# Patient Record
Sex: MALE | Race: BLACK OR AFRICAN AMERICAN | NOT HISPANIC OR LATINO | Employment: OTHER | URBAN - METROPOLITAN AREA
[De-identification: names, ages, dates, MRNs, and addresses within clinical notes are randomized per-mention and may not be internally consistent; named-entity substitution may affect disease eponyms.]

---

## 2018-01-30 NOTE — PROGRESS NOTES
This note was created by combination of typed  and Dragon dictation.  Transcription errors may be present.  If there are any questions, please contact me.    Assessment & Plan  Essential hypertension - stable, no change, refilled amlodipine.  -     amLODIPine (NORVASC) 10 MG tablet; Take 1 tablet (10 mg total) by mouth once daily.  Dispense: 90 tablet; Refill: 3    Smoker - precontemplative stage of quitting.  AAA screening negative with most recent CT Abd/pelvis. Discussed risks of continued smoking including CA risk, CAD, stroke risk, PAD risk.    Prostate cancer - managed by [facility] urology.    Pure hypercholesterolemia - with risk factors of age, HTN, smoker, start statin, lipitor 20.  -     atorvastatin (LIPITOR) 20 MG tablet; Take 1 tablet (20 mg total) by mouth once daily.  Dispense: 90 tablet; Refill: 3    Old records request from [facility] requested  [facility] notes reviewed    Medications Discontinued During This Encounter   Medication Reason    amLODIPine (NORVASC) 10 MG tablet Reorder       Follow-up: No Follow-up on file. Fasting f/u 6 months.      =================================================================      No chief complaint on file.      MONTEZ Aleman is a 67 y.o. male, last appointment with this clinic was Visit date not found.    NP; was going to [facility].  Dr. Burkett.  Until change of insurance.    Hx of prostate CA.  S/p prostatectomy and XRT. [facility].  s/p open RRP in 2/16/2012. His preop bx was Gl 3+3, PSA was 5.1. Final pathology was Gl 3+4, margins negative, no SVs identified in specimen, no lymph nodes collected, pT2c, pNx, pMx. He got lost to follow up and on return his PSA is was 1.2. Harley of <0.01. Is now s/p salvage radiation therapy and PSA has been undetectable since.  10/24/17 CT Impression 1. No evidence of urolithiasis or obstructive uropathy.2. Status post prostatectomy. No pelvic or retroperitoneal  lymphadenopathy. 3. Stable skeletal sclerotic metastasis.  10/28/17 Cystoscopy Negative  HTN, hx of ARB with angioedema  Smoker 1 PPD x 30 years; precontemplative stage of quitting.   10/2017 CT abd/pelvis for surveillance of prostate CA, negative for AAA.    Was rx'd oxybutynin but was concerned about SE of blurred vision.  So didn't take it.    10/2/2017 microalbumin 2.9   TG 99 HDL 79 LDL 90  /4.6/98/23/9/1.04 glc 101  AST 20 ALT 14  a1c 5.6  TSH 1.64    Had colonoscopy 7/2017, I need report.    Patient Care Team:  Gabriel Ochoa MD as PCP - General (Internal Medicine)  Spike Khan MD (Internal Medicine)  Brook Hanson MD as Consulting Physician (Radiation Oncology)    Patient Active Problem List    Diagnosis Date Noted    Smoker 01/31/2018    Prostate cancer 01/31/2018 7/2016 [facility] bone scan: Focally increased uptake within the L54 vertebral body, concerning for metastatic disease. Additional areas of sclerosis are seen on the CT within the T12 vertebral body inferiorly and several of the lumbar vertebrae. These areas are also concerning. If this would affect management, additional evaluation with MRI with intravenous contrast may be helpful for confirmation.  7/2016 MRI L spine: Heterogeneous prominent focus of enhancement within the right inferior L4 vertebral body.  Although differential may include degenerative change, degree of enhancement and clinical history raise suspicion for metastatic lesion.  Sclerotic foci at other levels are nonspecific and may represent metastatic disease although less likely, or degenerative change. Short interval followup at 1-2 months with repeat contrast MRI may allow for evaluation of progression for Stability. Spondylotic changes as above most prominent L4-5 and L5-S1.  10/2017 CT abd/pelvis: 1. No evidence of urolithiasis or obstructive uropathy. 2. Status post prostatectomy. No pelvic or retroperitoneal lymphadenopathy. 3. Stable skeletal  sclerotic metastasis.      Hypertension, hx of angioedema from ARB; hx of olmesartan/amlodipine changed to amlodipine        PAST MEDICAL HISTORY:  Past Medical History:   Diagnosis Date    Hypertension        PAST SURGICAL HISTORY:  Past Surgical History:   Procedure Laterality Date    CARPAL TUNNEL RELEASE Bilateral     HERNIA REPAIR      PROSTATE SURGERY       Family History   Problem Relation Age of Onset    Heart disease Mother     Heart disease Sister     No Known Problems Brother     No Known Problems Son     No Known Problems Son        SOCIAL HISTORY:  Social History     Social History    Marital status:      Spouse name: N/A    Number of children: N/A    Years of education: N/A     Occupational History    Not on file.     Social History Main Topics    Smoking status: Current Every Day Smoker     Types: Cigarettes    Smokeless tobacco: Never Used    Alcohol use Yes    Drug use: No    Sexual activity: Yes     Partners: Female     Other Topics Concern    Not on file     Social History Narrative    No narrative on file       ALLERGIES AND MEDICATIONS: updated and reviewed.  Review of patient's allergies indicates:  Allergies not on file  Current Outpatient Prescriptions   Medication Sig Dispense Refill    amLODIPine (NORVASC) 10 MG tablet       atorvastatin (LIPITOR) 20 MG tablet Take 1 tablet (20 mg total) by mouth once daily. 90 tablet 3     No current facility-administered medications for this visit.      Oxybutynin not taking    Review of Systems   Constitutional: Negative for fever, malaise/fatigue and weight loss.   HENT: Negative for congestion.    Eyes: Negative for blurred vision and pain.   Respiratory: Negative for shortness of breath and wheezing.    Cardiovascular: Negative for chest pain, palpitations and leg swelling.   Gastrointestinal: Negative for abdominal pain, blood in stool, constipation, diarrhea and heartburn.   Genitourinary: Negative for dysuria,  "hematuria and urgency.   Musculoskeletal: Negative for joint pain.   Neurological: Negative for tingling, focal weakness, weakness and headaches.       Physical Exam   Vitals:    01/31/18 1434   SpO2: 98%   Weight: 98.7 kg (217 lb 9.5 oz)   Height: 6' 2" (1.88 m)    Body mass index is 27.94 kg/m².  Weight: 98.7 kg (217 lb 9.5 oz)   Height: 6' 2" (188 cm)     Physical Exam   Constitutional: He is oriented to person, place, and time. He appears well-developed and well-nourished. No distress.   Eyes: EOM are normal.   Cardiovascular: Normal rate, regular rhythm and normal heart sounds.    No murmur heard.  Pulmonary/Chest: Effort normal and breath sounds normal.   Musculoskeletal: Normal range of motion. He exhibits no edema.   Neurological: He is alert and oriented to person, place, and time. Coordination normal.   Skin: Skin is warm and dry.   Psychiatric: He has a normal mood and affect. His behavior is normal. Thought content normal.     Outside records from [facility] reviewed and updated problem list  Outside labs brought in by the pt from [facility] reviewed and summarized in the HPI  "

## 2018-01-31 ENCOUNTER — OFFICE VISIT (OUTPATIENT)
Dept: FAMILY MEDICINE | Facility: CLINIC | Age: 68
End: 2018-01-31
Payer: MEDICARE

## 2018-01-31 VITALS — OXYGEN SATURATION: 98 % | HEIGHT: 74 IN | BODY MASS INDEX: 27.93 KG/M2 | WEIGHT: 217.63 LBS

## 2018-01-31 DIAGNOSIS — C61 PROSTATE CANCER: ICD-10-CM

## 2018-01-31 DIAGNOSIS — E78.00 PURE HYPERCHOLESTEROLEMIA: ICD-10-CM

## 2018-01-31 DIAGNOSIS — F17.200 SMOKER: ICD-10-CM

## 2018-01-31 DIAGNOSIS — I10 ESSENTIAL HYPERTENSION: Primary | ICD-10-CM

## 2018-01-31 PROCEDURE — 99203 OFFICE O/P NEW LOW 30 MIN: CPT | Mod: S$GLB,,, | Performed by: INTERNAL MEDICINE

## 2018-01-31 PROCEDURE — 99999 PR PBB SHADOW E&M-NEW PATIENT-LVL III: CPT | Mod: PBBFAC,,, | Performed by: INTERNAL MEDICINE

## 2018-01-31 PROCEDURE — 3008F BODY MASS INDEX DOCD: CPT | Mod: S$GLB,,, | Performed by: INTERNAL MEDICINE

## 2018-01-31 RX ORDER — ATORVASTATIN CALCIUM 20 MG/1
20 TABLET, FILM COATED ORAL DAILY
Qty: 90 TABLET | Refills: 3 | Status: SHIPPED | OUTPATIENT
Start: 2018-01-31 | End: 2019-01-02 | Stop reason: SDUPTHER

## 2018-01-31 RX ORDER — AMLODIPINE BESYLATE 10 MG/1
TABLET ORAL
COMMUNITY
Start: 2017-11-13 | End: 2018-01-31 | Stop reason: SDUPTHER

## 2018-01-31 RX ORDER — AMLODIPINE BESYLATE 10 MG/1
10 TABLET ORAL DAILY
Qty: 90 TABLET | Refills: 3 | Status: SHIPPED | OUTPATIENT
Start: 2018-01-31 | End: 2019-01-02 | Stop reason: SDUPTHER

## 2018-02-02 ENCOUNTER — TELEPHONE (OUTPATIENT)
Dept: FAMILY MEDICINE | Facility: CLINIC | Age: 68
End: 2018-02-02

## 2018-02-02 NOTE — TELEPHONE ENCOUNTER
----- Message from Francisca Henry sent at 2/1/2018  2:13 PM CST -----  Contact: self  Patient requests to speak with staff regarding medications. He can be reached at [phone]. Thank you!

## 2018-07-27 PROBLEM — E78.00 PURE HYPERCHOLESTEROLEMIA: Status: ACTIVE | Noted: 2018-07-27

## 2018-07-27 NOTE — PROGRESS NOTES
This note was created by combination of typed  and Dragon dictation.  Transcription errors may be present.  If there are any questions, please contact me.    Assessment & Plan:   Essential hypertension  -I was under the impression he is on amlodipine monotherapy. He thinks he is still taking lito (amlodipine/olmesartan).  He'll check his meds at home and call back to confirm. Check CMP and lipid.  -     Comprehensive metabolic panel; Future; Expected date: 07/30/2018  -     Lipid panel; Future; Expected date: 07/30/2018    Pure hypercholesterolemia  -risk factors - age, HTN, smoker.  Suspect his risk will be high enough to take statin. He filled the rx but never took it b/c was worried about listed SE.  Discussed typical SE profile and would start if indicated and I suspect his levels will support its use  Start ASA 81 mg.  -     aspirin (ECOTRIN) 81 MG EC tablet; Take 1 tablet (81 mg total) by mouth once daily.; Refill: 0    Smoker  -precontemplative stage of quitting. We talked about nicotine replacement therapy. Would start nicotine patch 7 mg if he's interested.  Available OTC.  -     Lipid panel; Future; Expected date: 07/30/2018    Need for hepatitis C screening test  -     Hepatitis C antibody; Future; Expected date: 07/30/2018    Visual acuity reduced - referred to optometry.  -     Ambulatory referral to Optometry    Request for outside colonoscopy report from [facility].    There are no discontinued medications.  Modified Medications    No medications on file     New Prescriptions    ASPIRIN (ECOTRIN) 81 MG EC TABLET    Take 1 tablet (81 mg total) by mouth once daily.       Follow Up: No Follow-up on file.        Subjective:     Chief Complaint   Patient presents with    Hypertension    Hyperlipidemia       MONTEZ Aleman is a 68 y.o. male, last appointment with this clinic was 1/31/2018.    Hx of prostate CA.  S/p prostatectomy and XRT. [facility].  s/p open RRP in 2/16/2012. His  preop bx was Gl 3+3, PSA was 5.1. Final pathology was Gl 3+4, margins negative, no SVs identified in specimen, no lymph nodes collected, pT2c, pNx, pMx. He got lost to follow up and on return his PSA is was 1.2. Harley of <0.01. Is now s/p salvage radiation therapy and PSA has been undetectable since.  10/24/17 CT Impression 1. No evidence of urolithiasis or obstructive uropathy.2. Status post prostatectomy. No pelvic or retroperitoneal lymphadenopathy. 3. Stable skeletal sclerotic metastasis.  10/28/17 Cystoscopy Negative  HTN, hx of ARB with angioedema  Hyperlipidemia, 1/2018 statin  Smoker 1 PPD x 30 years; precontemplative stage of quitting.   10/2017 CT abd/pelvis for surveillance of prostate CA, negative for AAA.  Had colonoscopy 7/2017, I need report.    Outside labs done with [facility], 09/28/2017 BMP normal.  2017 CT abdomen and pelvis:   1. No evidence of urolithiasis or obstructive uropathy.  2. Status post prostatectomy. No pelvic or retroperitoneal  lymphadenopathy.  3. Stable skeletal sclerotic metastasis.  7/2016 MRI L spine: Heterogeneous prominent focus of enhancement within the right inferior L4 vertebral body.  Although differential may include degenerative change, degree of enhancement and clinical history raise  suspicion for metastatic lesion.  Sclerotic foci at other levels are  nonspecific and may represent metastatic disease although less  likely, or degenerative change. Short interval followup at 1-2 months  with repeat contrast MRI may allow for evaluation of progression for  Stability.    Last seen by urology 9/2017.  PSA with [facility] 1/25/18, 0.3.    Needs labs. Needs HCV screen. Needs colonoscopy or the report.    Was reading the SE profile for statin and was worried and did not start it.   It's unclear if he is taking amlodipine/olmesartan or plain amlodipine.  Outside pharmacy references the [facility].     Not taking ASA.     Would like a routine eye exam for decreased acuity/needs new  glasses.    Thinks he had colonoscopy done at .  Does not know the results.     Recalls about 10 years ago- at work - fell in a 10 foot ditch and now feels occasional twinge of pain in the right lower back area.     Physical activity - bicycling daily.  Prefers this to driving.     Patient Care Team:  Gabriel Ochoa MD as PCP - General (Internal Medicine)  Spike Khan MD (Internal Medicine)  Brook Hanson MD as Consulting Physician (Radiation Oncology)    Patient Active Problem List    Diagnosis Date Noted    Pure hypercholesterolemia 07/27/2018    Smoker 01/31/2018    Prostate cancer 01/31/2018 7/2016 [facility] med bone scan: Focally increased uptake within the L54 vertebral body, concerning for metastatic disease. Additional areas of sclerosis are seen on the CT within the T12 vertebral body inferiorly and several of the lumbar vertebrae. These areas are also concerning. If this would affect management, additional evaluation with MRI with intravenous contrast may be helpful for confirmation.  7/2016 MRI L spine: Heterogeneous prominent focus of enhancement within the right inferior L4 vertebral body.  Although differential may include degenerative change, degree of enhancement and clinical history raise suspicion for metastatic lesion.  Sclerotic foci at other levels are nonspecific and may represent metastatic disease although less likely, or degenerative change. Short interval followup at 1-2 months with repeat contrast MRI may allow for evaluation of progression for Stability. Spondylotic changes as above most prominent L4-5 and L5-S1.  10/2017 CT abd/pelvis: 1. No evidence of urolithiasis or obstructive uropathy. 2. Status post prostatectomy. No pelvic or retroperitoneal lymphadenopathy. 3. Stable skeletal sclerotic metastasis.      Hypertension, hx of angioedema from ARB; hx of olmesartan/amlodipine changed to amlodipine        PAST MEDICAL HISTORY:  Past Medical History:   Diagnosis Date     Hypertension        PAST SURGICAL HISTORY:  Past Surgical History:   Procedure Laterality Date    CARPAL TUNNEL RELEASE Bilateral     HERNIA REPAIR      PROSTATE SURGERY         SOCIAL HISTORY:  Social History     Social History    Marital status:      Spouse name: N/A    Number of children: N/A    Years of education: N/A     Occupational History    Not on file.     Social History Main Topics    Smoking status: Current Every Day Smoker     Types: Cigarettes    Smokeless tobacco: Never Used    Alcohol use Yes    Drug use: No    Sexual activity: Yes     Partners: Female     Other Topics Concern    Not on file     Social History Narrative    No narrative on file       ALLERGIES AND MEDICATIONS: updated and reviewed.  Review of patient's allergies indicates:  No Known Allergies  Current Outpatient Prescriptions   Medication Sig Dispense Refill    amLODIPine (NORVASC) 10 MG tablet Take 1 tablet (10 mg total) by mouth once daily. 90 tablet 3    atorvastatin (LIPITOR) 20 MG tablet Take 1 tablet (20 mg total) by mouth once daily. 90 tablet 3     No current facility-administered medications for this visit.        Review of Systems   Constitutional: Negative for fever, malaise/fatigue and weight loss.   HENT: Negative for congestion.    Eyes: Negative for blurred vision and pain.   Respiratory: Negative for shortness of breath and wheezing.    Cardiovascular: Negative for chest pain, palpitations and leg swelling.   Gastrointestinal: Negative for abdominal pain, blood in stool, constipation, diarrhea and heartburn.   Genitourinary: Negative for dysuria, hematuria and urgency.   Musculoskeletal: Positive for back pain. Negative for joint pain.   Neurological: Negative for tingling, focal weakness, weakness and headaches.   Psychiatric/Behavioral: Negative for depression. The patient is not nervous/anxious.        Objective:   Physical Exam   Vitals:    07/30/18 0901   BP: 120/80   Pulse: 64   Temp: 98.1  "°F (36.7 °C)   Weight: 97.5 kg (214 lb 15.2 oz)   Height: 6' 2.5" (1.892 m)    Body mass index is 27.23 kg/m².  Weight: 97.5 kg (214 lb 15.2 oz)   Height: 6' 2.5" (189.2 cm)     Physical Exam   Constitutional: He is oriented to person, place, and time. He appears well-developed and well-nourished. No distress.   Eyes: EOM are normal.   Neck: No thyromegaly present.   Cardiovascular: Normal rate, regular rhythm and normal heart sounds.    No murmur heard.  Pulmonary/Chest: Effort normal and breath sounds normal.   Musculoskeletal: Normal range of motion. He exhibits no edema.   Pointing to the lateral iliac crest as the area of pain, no TTP; L spine nontender.   Lymphadenopathy:     He has no cervical adenopathy.   Neurological: He is alert and oriented to person, place, and time. Coordination normal.   Skin: Skin is warm and dry.   Psychiatric: He has a normal mood and affect. His behavior is normal. Thought content normal.     "

## 2018-07-30 ENCOUNTER — OFFICE VISIT (OUTPATIENT)
Dept: FAMILY MEDICINE | Facility: CLINIC | Age: 68
End: 2018-07-30
Payer: MEDICARE

## 2018-07-30 ENCOUNTER — LAB VISIT (OUTPATIENT)
Dept: LAB | Facility: HOSPITAL | Age: 68
End: 2018-07-30
Attending: INTERNAL MEDICINE
Payer: MEDICARE

## 2018-07-30 VITALS
TEMPERATURE: 98 F | DIASTOLIC BLOOD PRESSURE: 80 MMHG | HEART RATE: 64 BPM | WEIGHT: 214.94 LBS | SYSTOLIC BLOOD PRESSURE: 120 MMHG | HEIGHT: 75 IN | BODY MASS INDEX: 26.73 KG/M2

## 2018-07-30 DIAGNOSIS — F17.200 SMOKER: ICD-10-CM

## 2018-07-30 DIAGNOSIS — I10 ESSENTIAL HYPERTENSION: Primary | ICD-10-CM

## 2018-07-30 DIAGNOSIS — I10 ESSENTIAL HYPERTENSION: ICD-10-CM

## 2018-07-30 DIAGNOSIS — H54.7 VISUAL ACUITY REDUCED: ICD-10-CM

## 2018-07-30 DIAGNOSIS — E78.00 PURE HYPERCHOLESTEROLEMIA: ICD-10-CM

## 2018-07-30 DIAGNOSIS — Z11.59 NEED FOR HEPATITIS C SCREENING TEST: ICD-10-CM

## 2018-07-30 LAB
ALBUMIN SERPL BCP-MCNC: 3.7 G/DL
ALP SERPL-CCNC: 60 U/L
ALT SERPL W/O P-5'-P-CCNC: 11 U/L
ANION GAP SERPL CALC-SCNC: 8 MMOL/L
AST SERPL-CCNC: 20 U/L
BILIRUB SERPL-MCNC: 0.5 MG/DL
BUN SERPL-MCNC: 9 MG/DL
CALCIUM SERPL-MCNC: 9.3 MG/DL
CHLORIDE SERPL-SCNC: 107 MMOL/L
CHOLEST SERPL-MCNC: 185 MG/DL
CHOLEST/HDLC SERPL: 2.7 {RATIO}
CO2 SERPL-SCNC: 27 MMOL/L
CREAT SERPL-MCNC: 0.9 MG/DL
EST. GFR  (AFRICAN AMERICAN): >60 ML/MIN/1.73 M^2
EST. GFR  (NON AFRICAN AMERICAN): >60 ML/MIN/1.73 M^2
GLUCOSE SERPL-MCNC: 97 MG/DL
HDLC SERPL-MCNC: 69 MG/DL
HDLC SERPL: 37.3 %
LDLC SERPL CALC-MCNC: 98.4 MG/DL
NONHDLC SERPL-MCNC: 116 MG/DL
POTASSIUM SERPL-SCNC: 3.9 MMOL/L
PROT SERPL-MCNC: 7.6 G/DL
SODIUM SERPL-SCNC: 142 MMOL/L
TRIGL SERPL-MCNC: 88 MG/DL

## 2018-07-30 PROCEDURE — 99999 PR PBB SHADOW E&M-EST. PATIENT-LVL IV: CPT | Mod: PBBFAC,,, | Performed by: INTERNAL MEDICINE

## 2018-07-30 PROCEDURE — 36415 COLL VENOUS BLD VENIPUNCTURE: CPT | Mod: PO

## 2018-07-30 PROCEDURE — 3074F SYST BP LT 130 MM HG: CPT | Mod: CPTII,S$GLB,, | Performed by: INTERNAL MEDICINE

## 2018-07-30 PROCEDURE — 86803 HEPATITIS C AB TEST: CPT

## 2018-07-30 PROCEDURE — 80053 COMPREHEN METABOLIC PANEL: CPT

## 2018-07-30 PROCEDURE — 3079F DIAST BP 80-89 MM HG: CPT | Mod: CPTII,S$GLB,, | Performed by: INTERNAL MEDICINE

## 2018-07-30 PROCEDURE — 99214 OFFICE O/P EST MOD 30 MIN: CPT | Mod: S$GLB,,, | Performed by: INTERNAL MEDICINE

## 2018-07-30 PROCEDURE — 80061 LIPID PANEL: CPT

## 2018-07-30 RX ORDER — ASPIRIN 81 MG/1
81 TABLET ORAL DAILY
Refills: 0 | COMMUNITY
Start: 2018-07-30 | End: 2019-07-30

## 2018-07-30 NOTE — PATIENT INSTRUCTIONS
START TAKING LOW DOSE ASPIRIN 81 MG.  DOES NOT MATTER WHICH BRAND.    STOP SMOKING!  IF YOU ARE INTERESTED IN NICOTINE REPLACEMENT - THERE IS LOW DOSE (7 MG) NICOTINE PATCH AND NICOTINE GUM AVAILABLE WITHOUT PRESCRIPTION.

## 2018-07-31 LAB — HCV AB SERPL QL IA: NEGATIVE

## 2018-08-01 DIAGNOSIS — E78.00 PURE HYPERCHOLESTEROLEMIA: Primary | ICD-10-CM

## 2018-08-01 PROBLEM — D12.6 TUBULAR ADENOMA OF COLON: Status: ACTIVE | Noted: 2018-08-01

## 2018-08-01 NOTE — PROGRESS NOTES
Lipid was actually pretty good but with risk factors (age, background, HTN, smoker) 10 year ASCVD is high would start the statin. He picked it up but never started it.    Pt was supposed to confirm what medicine he was taking (amlodipine/olmesartan or just plain amlodipine) - I don't see where he called us back.    Please call pt - what BP med is he taking?  Also, he should start taking his cholesterol medicine.    Please send back to me with reply.  Plan for follow up 6 months.

## 2018-08-06 ENCOUNTER — TELEPHONE (OUTPATIENT)
Dept: FAMILY MEDICINE | Facility: CLINIC | Age: 68
End: 2018-08-06

## 2018-08-06 NOTE — TELEPHONE ENCOUNTER
----- Message from Gabriel Ochoa MD sent at 8/1/2018  7:30 AM CDT -----  Lipid was actually pretty good but with risk factors (age, background, HTN, smoker) 10 year ASCVD is high would start the statin. He picked it up but never started it.    Pt was supposed to confirm what medicine he was taking (amlodipine/olmesartan or just plain amlodipine) - I don't see where he called us back.    Please call pt - what BP med is he taking?  Also, he should start taking his cholesterol medicine.    Please send back to me with reply.  Plan for follow up 6 months.

## 2018-08-07 ENCOUNTER — OFFICE VISIT (OUTPATIENT)
Dept: OPTOMETRY | Facility: CLINIC | Age: 68
End: 2018-08-07
Payer: MEDICARE

## 2018-08-07 DIAGNOSIS — H52.7 REFRACTIVE ERROR: ICD-10-CM

## 2018-08-07 DIAGNOSIS — I10 HYPERTENSION, UNSPECIFIED TYPE: ICD-10-CM

## 2018-08-07 DIAGNOSIS — H25.13 NUCLEAR SCLEROSIS OF BOTH EYES: Primary | ICD-10-CM

## 2018-08-07 PROCEDURE — 99999 PR PBB SHADOW E&M-EST. PATIENT-LVL II: CPT | Mod: PBBFAC,,, | Performed by: OPTOMETRIST

## 2018-08-07 PROCEDURE — 92015 DETERMINE REFRACTIVE STATE: CPT | Mod: S$GLB,,, | Performed by: OPTOMETRIST

## 2018-08-07 PROCEDURE — 92004 COMPRE OPH EXAM NEW PT 1/>: CPT | Mod: S$GLB,,, | Performed by: OPTOMETRIST

## 2018-08-07 NOTE — PROGRESS NOTES
Subjective:       Patient ID: Ash Cdeeno is a 68 y.o. male      Chief Complaint   Patient presents with    Concerns About Ocular Health    Hypertensive Eye Exam     History of Present Illness  Dls: ? Yrs     69 y/o male presents today for hypertensive eye exam.   Pt c.o blurry vision at near ou. Pt wears single vision glasses for   reading.     No tearing  No itching   No burning  No pain  No ha's  No floaters  No flashes     Eye meds:  Visine ou prn          Assessment/Plan:     1. Nuclear sclerosis of both eyes  Educated pt on presence of cataracts and effects on vision. No surgery at this time. Recheck in one year.    2. Hypertension, unspecified type  No hypertensive retinopathy. Continue BP control. RTC 1 year for DFE.    3. Refractive error  Educated patient on refractive error and discussed lens options. Dispensed updated spectacle Rx. Educated about adaptation period to new specs.    Eyeglass Final Rx     Eyeglass Final Rx       Sphere Cylinder Axis Add    Right +0.50 +1.50 180 +2.50    Left +1.00 +0.75 175 +2.50    Expiration Date:  8/8/2019                  Follow-up in about 1 year (around 8/7/2019).

## 2018-08-07 NOTE — LETTER
August 7, 2018      Gabriel Ochoa MD  [address] [place] [address]           [facility]  [address] [place] [address]  Phone: [phone]  Fax: [phone]          Patient: Ash Cedeno   MR Number: [id number]   YOB: 1950   Date of Visit: 8/7/2018       Dear Dr. Gabriel Ochoa:    Thank you for referring Ash Cedeno to me for evaluation. Attached you will find relevant portions of my assessment and plan of care.    If you have questions, please do not hesitate to call me. I look forward to following Ash Cedeno along with you.    Sincerely,    Martha Souza, OD    Enclosure  CC:  No Recipients    If you would like to receive this communication electronically, please contact [email or web address] or [phone] to request more information on [employer] access.    For providers and/or their staff who would like to refer a patient to [facility], please contact us through our one-stop-shop provider referral line, [facility], at [phone].    If you feel you have received this communication in error or would no longer like to receive these types of communications, please e-mail [email or web address]

## 2018-08-08 ENCOUNTER — TELEPHONE (OUTPATIENT)
Dept: ADMINISTRATIVE | Facility: HOSPITAL | Age: 68
End: 2018-08-08

## 2018-08-08 NOTE — TELEPHONE ENCOUNTER
Spoke with pt he is taking the Amlodipine, pt verbally acknowledged instructions as stated below.

## 2018-12-05 ENCOUNTER — TELEPHONE (OUTPATIENT)
Dept: FAMILY MEDICINE | Facility: CLINIC | Age: 68
End: 2018-12-05

## 2018-12-05 NOTE — TELEPHONE ENCOUNTER
----- Message from Flor Palacio sent at 12/5/2018  9:33 AM CST -----  Contact: Self   Pt calling to speak to a nurse regarding recall on med. [phone]

## 2018-12-05 NOTE — TELEPHONE ENCOUNTER
Spoke with pt states he saw on tv bp medication is recalled . Advised to contact his pharmacy to verify if his medication has been effected. Pt verbalized understanding.

## 2018-12-31 NOTE — PROGRESS NOTES
This note was created by combination of typed  and Dragon dictation.  Transcription errors may be present.  If there are any questions, please contact me.    Assessment & Plan:   Pure hypercholesterolemia  -check lipid profile on Lipitor 20.  Refill to pharmacy.  -     Comprehensive metabolic panel; Future; Expected date: 01/02/2019  -     Lipid panel; Future; Expected date: 01/02/2019  -     atorvastatin (LIPITOR) 20 MG tablet; Take 1 tablet (20 mg total) by mouth once daily.  Dispense: 90 tablet; Refill: 3    Prostate cancer s/p prostatectomy 2/16/12 and XRT with [facility]  -overdue for follow-up with Urology at [facility].  Check PSA.  They should be able to see the results.  -     PROSTATE SPECIFIC ANTIGEN, DIAGNOSTIC; Future; Expected date: 01/02/2019    Need for vaccination for Strep pneumoniae  -     (In Office Administered) Pneumococcal Conjugate Vaccine (13 Valent) (IM)    Needs flu shot  -     Influenza - High Dose (65+) (PF) (IM)    Essential hypertension  -stable, no change, amlodipine 10  -     amLODIPine (NORVASC) 10 MG tablet; Take 1 tablet (10 mg total) by mouth once daily.  Dispense: 90 tablet; Refill: 3    Need for shingles vaccine  -     varicella-zoster gE-AS01B, PF, (SHINGRIX, PF,) 50 mcg/0.5 mL injection; Inject 0.5 mLs into the muscle once. Repeat in 2 months for 1 dose  Dispense: 0.5 mL; Refill: 1    Tinea pedis of both feet  -topical nystatin powder.  No evidence of cellulitis.  I have also recommended he get over-the-counter Zeasorb as well.  -     nystatin (MYCOSTATIN) powder; Apply topically 4 (four) times daily. To both feet  Dispense: 60 g; Refill: 2    Smoker  -precontemplative stage of quitting. Discussed benefits of quitting.     Other orders  -     Cancel: Comprehensive metabolic panel; Future; Expected date: 12/31/2018  -     Cancel: Lipid panel; Future; Expected date: 12/31/2018        Medications Discontinued During This Encounter   Medication Reason    amLODIPine (NORVASC)  10 MG tablet Reorder    atorvastatin (LIPITOR) 20 MG tablet Reorder       meds sent this encounter:  Medications Ordered This Encounter   Medications    amLODIPine (NORVASC) 10 MG tablet     Sig: Take 1 tablet (10 mg total) by mouth once daily.     Dispense:  90 tablet     Refill:  3    atorvastatin (LIPITOR) 20 MG tablet     Sig: Take 1 tablet (20 mg total) by mouth once daily.     Dispense:  90 tablet     Refill:  3    nystatin (MYCOSTATIN) powder     Sig: Apply topically 4 (four) times daily. To both feet     Dispense:  60 g     Refill:  2    varicella-zoster gE-AS01B, PF, (SHINGRIX, PF,) 50 mcg/0.5 mL injection     Sig: Inject 0.5 mLs into the muscle once. Repeat in 2 months for 1 dose     Dispense:  0.5 mL     Refill:  1       Follow Up: No Follow-up on file. if all normal OV 6 months recall set    Subjective:     Chief Complaint   Patient presents with    Hypertension    Hyperlipidemia    raw between his toes       MONTEZ Aleman is a 68 y.o. male, last appointment with this clinic was 7/30/2018.    Hx of prostate CA.  S/p prostatectomy and XRT. [facility].  s/p open RRP in 2/16/2012. His preop bx was Gl 3+3, PSA was 5.1. Final pathology was Gl 3+4, margins negative, no SVs identified in specimen, no lymph nodes collected, pT2c, pNx, pMx. He got lost to follow up and on return his PSA is was 1.2. Harley of <0.01. Is now s/p salvage radiation therapy and PSA has been undetectable since.  10/24/17 CT Impression 1. No evidence of urolithiasis or obstructive uropathy.2. Status post prostatectomy. No pelvic or retroperitoneal lymphadenopathy. 3. Stable skeletal sclerotic metastasis.  10/28/17 Cystoscopy Negative  HTN, hx of ARB with angioedema  Hyperlipidemia, 7/2018 statin  Smoker 1 PPD x 30 years; precontemplative stage of quitting.   10/2017 CT abd/pelvis for surveillance of prostate CA, negative for AAA.  Had colonoscopy 8/8/17 with transverse colon polyp repeat 5 years.    Outside labs done  with [facility], 09/28/2017 BMP normal.  2017 CT abdomen and pelvis:   1. No evidence of urolithiasis or obstructive uropathy.  2. Status post prostatectomy. No pelvic or retroperitoneal  lymphadenopathy.  3. Stable skeletal sclerotic metastasis.  7/2016 MRI L spine: Heterogeneous prominent focus of enhancement within the right inferior L4 vertebral body.  Although differential may include degenerative change, degree of enhancement and clinical history raise  suspicion for metastatic lesion.  Sclerotic foci at other levels are  nonspecific and may represent metastatic disease although less  likely, or degenerative change. Short interval followup at 1-2 months  with repeat contrast MRI may allow for evaluation of progression for  Stability.     Last seen by urology 9/2017.  PSA with [facility] 1/25/18, 0.3.    Last seen in July.  At the time he was not taking statin.  He was worried about side effects.    Labs done in July lipid was actually pretty good but with his risk factors I wanted him to start statin.  He should also be on aspirin.    At his last visit he was on amlodipine monotherapy.    Review of his [facility] pharmacy refills he has been taking amlodipine and atorvastatin as directed.  Is not taking oxybutynin.    Notes raw skin between the toes bilaterally.  Painful on the left.  Both feet. Between the pinky toe and ring toe.  Nothing OTC other than rubbing alcohol after bathing.  Chronic x years but only recently started hurting.     Still smoking, < 1 PPD.  Never nicotine replacement. Not ready to quit.     Overdue for follow up with urology.  Notes dribbling.  I will check PSA for him.  Printed the contact information for urology for him    Patient Care Team:  Gabriel Ochoa MD as PCP - General (Internal Medicine)  Spike Khan MD (Internal Medicine)  Brook Hanson MD as Consulting Physician (Radiation Oncology)    Patient Active Problem List    Diagnosis Date Noted    Nuclear sclerosis of both eyes  08/07/2018    Refractive error 08/07/2018    Tubular adenoma of colon 8/8/217 08/01/2018    Pure hypercholesterolemia 07/27/2018    Smoker 01/31/2018    Prostate cancer s/p prostatectomy 2/16/12 and XRT with [facility] 01/31/2018     s/p open RRP in 2/16/2012. His preop bx was Gl 3+3, PSA was 5.1. Final pathology was Gl 3+4, margins negative, no SVs identified in specimen, no lymph nodes collected, pT2c, pNx, pMx. He got lost to follow up and on return his PSA is was 1.2. Harley of <0.01. Is now s/p salvage radiation therapy and PSA has been undetectable since.  10/28/17 Cystoscopy Negative  7/2016 Nu med bone scan: Focally increased uptake within the L54 vertebral body, concerning for metastatic disease. Additional areas of sclerosis are seen on the CT within the T12 vertebral body inferiorly and several of the lumbar vertebrae. These areas are also concerning. If this would affect management, additional evaluation with MRI with intravenous contrast may be helpful for confirmation.  7/2016 MRI L spine: Heterogeneous prominent focus of enhancement within the right inferior L4 vertebral body.  Although differential may include degenerative change, degree of enhancement and clinical history raise suspicion for metastatic lesion.  Sclerotic foci at other levels are nonspecific and may represent metastatic disease although less likely, or degenerative change. Short interval followup at 1-2 months with repeat contrast MRI may allow for evaluation of progression for Stability. Spondylotic changes as above most prominent L4-5 and L5-S1.  10/2017 CT abd/pelvis: 1. No evidence of urolithiasis or obstructive uropathy. 2. Status post prostatectomy. No pelvic or retroperitoneal lymphadenopathy. 3. Stable skeletal sclerotic metastasis.      Hypertension, hx of angioedema from ARB; hx of olmesartan/amlodipine changed to amlodipine        PAST MEDICAL HISTORY:  Past Medical History:   Diagnosis Date    Hyperlipidemia      Hypertension     Nuclear sclerosis of both eyes 8/7/2018       PAST SURGICAL HISTORY:  Past Surgical History:   Procedure Laterality Date    CARPAL TUNNEL RELEASE Bilateral     HERNIA REPAIR      PROSTATE SURGERY         SOCIAL HISTORY:  Social History     Socioeconomic History    Marital status:      Spouse name: Not on file    Number of children: Not on file    Years of education: Not on file    Highest education level: Not on file   Social Needs    Financial resource strain: Not on file    Food insecurity - worry: Not on file    Food insecurity - inability: Not on file    Transportation needs - medical: Not on file    Transportation needs - non-medical: Not on file   Occupational History    Not on file   Tobacco Use    Smoking status: Current Every Day Smoker     Packs/day: 0.20     Years: 30.00     Pack years: 6.00     Types: Cigarettes    Smokeless tobacco: Never Used   Substance and Sexual Activity    Alcohol use: Yes    Drug use: No    Sexual activity: Yes     Partners: Female   Other Topics Concern    Not on file   Social History Narrative    Not on file       ALLERGIES AND MEDICATIONS: updated and reviewed.  Review of patient's allergies indicates:  No Known Allergies  Current Outpatient Medications   Medication Sig Dispense Refill    amLODIPine (NORVASC) 10 MG tablet Take 1 tablet (10 mg total) by mouth once daily. 90 tablet 3    aspirin (ECOTRIN) 81 MG EC tablet Take 1 tablet (81 mg total) by mouth once daily.  0    atorvastatin (LIPITOR) 20 MG tablet Take 1 tablet (20 mg total) by mouth once daily. 90 tablet 3     No current facility-administered medications for this visit.        Review of Systems   Constitutional: Negative for chills and fever.   Respiratory: Negative for shortness of breath.    Cardiovascular: Negative for chest pain and palpitations.       Objective:   Physical Exam   Vitals:    01/02/19 1047   BP: 122/82   Pulse: 88   Temp: 98.4 °F (36.9 °C)   SpO2:  "99%   Weight: 98.9 kg (218 lb)   Height: 6' 2.5" (1.892 m)    Body mass index is 27.62 kg/m².  Weight: 98.9 kg (218 lb)   Height: 6' 2.5" (189.2 cm)     Physical Exam   Constitutional: He is oriented to person, place, and time. He appears well-developed and well-nourished. No distress.   Eyes: EOM are normal.   Cardiovascular: Normal rate, regular rhythm and normal heart sounds.   No murmur heard.  Pulmonary/Chest: Effort normal and breath sounds normal.   Musculoskeletal: Normal range of motion.   Neurological: He is alert and oriented to person, place, and time. Coordination normal.   Skin: Rash noted.   intertrigenous areas between the toes with macerated skin in multiple areas without erythema or drainage.  No edema   Psychiatric: He has a normal mood and affect. His behavior is normal. Thought content normal.     "

## 2019-01-02 ENCOUNTER — OFFICE VISIT (OUTPATIENT)
Dept: FAMILY MEDICINE | Facility: CLINIC | Age: 69
End: 2019-01-02
Payer: MEDICARE

## 2019-01-02 ENCOUNTER — LAB VISIT (OUTPATIENT)
Dept: LAB | Facility: HOSPITAL | Age: 69
End: 2019-01-02
Attending: INTERNAL MEDICINE
Payer: MEDICARE

## 2019-01-02 VITALS
SYSTOLIC BLOOD PRESSURE: 122 MMHG | HEART RATE: 88 BPM | DIASTOLIC BLOOD PRESSURE: 82 MMHG | TEMPERATURE: 98 F | WEIGHT: 218 LBS | HEIGHT: 75 IN | BODY MASS INDEX: 27.1 KG/M2 | OXYGEN SATURATION: 99 %

## 2019-01-02 DIAGNOSIS — Z23 NEED FOR VACCINATION FOR STREP PNEUMONIAE: ICD-10-CM

## 2019-01-02 DIAGNOSIS — C61 PROSTATE CANCER: ICD-10-CM

## 2019-01-02 DIAGNOSIS — Z23 NEEDS FLU SHOT: ICD-10-CM

## 2019-01-02 DIAGNOSIS — Z23 NEED FOR SHINGLES VACCINE: ICD-10-CM

## 2019-01-02 DIAGNOSIS — F17.200 SMOKER: ICD-10-CM

## 2019-01-02 DIAGNOSIS — I10 ESSENTIAL HYPERTENSION: ICD-10-CM

## 2019-01-02 DIAGNOSIS — E78.00 PURE HYPERCHOLESTEROLEMIA: ICD-10-CM

## 2019-01-02 DIAGNOSIS — B35.3 TINEA PEDIS OF BOTH FEET: ICD-10-CM

## 2019-01-02 DIAGNOSIS — E78.00 PURE HYPERCHOLESTEROLEMIA: Primary | ICD-10-CM

## 2019-01-02 LAB
ALBUMIN SERPL BCP-MCNC: 3.7 G/DL
ALP SERPL-CCNC: 62 U/L
ALT SERPL W/O P-5'-P-CCNC: 17 U/L
ANION GAP SERPL CALC-SCNC: 7 MMOL/L
AST SERPL-CCNC: 22 U/L
BILIRUB SERPL-MCNC: 0.4 MG/DL
BUN SERPL-MCNC: 11 MG/DL
CALCIUM SERPL-MCNC: 9.5 MG/DL
CHLORIDE SERPL-SCNC: 104 MMOL/L
CHOLEST SERPL-MCNC: 129 MG/DL
CHOLEST/HDLC SERPL: 2.1 {RATIO}
CO2 SERPL-SCNC: 28 MMOL/L
COMPLEXED PSA SERPL-MCNC: 0.17 NG/ML
CREAT SERPL-MCNC: 1 MG/DL
EST. GFR  (AFRICAN AMERICAN): >60 ML/MIN/1.73 M^2
EST. GFR  (NON AFRICAN AMERICAN): >60 ML/MIN/1.73 M^2
GLUCOSE SERPL-MCNC: 91 MG/DL
HDLC SERPL-MCNC: 62 MG/DL
HDLC SERPL: 48.1 %
LDLC SERPL CALC-MCNC: 50.4 MG/DL
NONHDLC SERPL-MCNC: 67 MG/DL
POTASSIUM SERPL-SCNC: 4.3 MMOL/L
PROT SERPL-MCNC: 7.9 G/DL
SODIUM SERPL-SCNC: 139 MMOL/L
TRIGL SERPL-MCNC: 83 MG/DL

## 2019-01-02 PROCEDURE — G0008 ADMIN INFLUENZA VIRUS VAC: HCPCS | Mod: S$GLB,,, | Performed by: INTERNAL MEDICINE

## 2019-01-02 PROCEDURE — 99214 OFFICE O/P EST MOD 30 MIN: CPT | Mod: 25,S$GLB,, | Performed by: INTERNAL MEDICINE

## 2019-01-02 PROCEDURE — 80061 LIPID PANEL: CPT

## 2019-01-02 PROCEDURE — 3079F PR MOST RECENT DIASTOLIC BLOOD PRESSURE 80-89 MM HG: ICD-10-PCS | Mod: CPTII,S$GLB,, | Performed by: INTERNAL MEDICINE

## 2019-01-02 PROCEDURE — 90662 IIV NO PRSV INCREASED AG IM: CPT | Mod: S$GLB,,, | Performed by: INTERNAL MEDICINE

## 2019-01-02 PROCEDURE — 3079F DIAST BP 80-89 MM HG: CPT | Mod: CPTII,S$GLB,, | Performed by: INTERNAL MEDICINE

## 2019-01-02 PROCEDURE — G0009 ADMIN PNEUMOCOCCAL VACCINE: HCPCS | Mod: S$GLB,,, | Performed by: INTERNAL MEDICINE

## 2019-01-02 PROCEDURE — 90662 FLU VACCINE - HIGH DOSE (65+) PRESERVATIVE FREE IM: ICD-10-PCS | Mod: S$GLB,,, | Performed by: INTERNAL MEDICINE

## 2019-01-02 PROCEDURE — 99499 RISK ADDL DX/OHS AUDIT: ICD-10-PCS | Mod: ,,, | Performed by: INTERNAL MEDICINE

## 2019-01-02 PROCEDURE — 1101F PR PT FALLS ASSESS DOC 0-1 FALLS W/OUT INJ PAST YR: ICD-10-PCS | Mod: CPTII,S$GLB,, | Performed by: INTERNAL MEDICINE

## 2019-01-02 PROCEDURE — 80053 COMPREHEN METABOLIC PANEL: CPT

## 2019-01-02 PROCEDURE — 99999 PR PBB SHADOW E&M-EST. PATIENT-LVL III: ICD-10-PCS | Mod: PBBFAC,,, | Performed by: INTERNAL MEDICINE

## 2019-01-02 PROCEDURE — 84153 ASSAY OF PSA TOTAL: CPT

## 2019-01-02 PROCEDURE — 3074F SYST BP LT 130 MM HG: CPT | Mod: CPTII,S$GLB,, | Performed by: INTERNAL MEDICINE

## 2019-01-02 PROCEDURE — 90670 PNEUMOCOCCAL CONJUGATE VACCINE 13-VALENT LESS THAN 5YO & GREATER THAN: ICD-10-PCS | Mod: S$GLB,,, | Performed by: INTERNAL MEDICINE

## 2019-01-02 PROCEDURE — 99499 UNLISTED E&M SERVICE: CPT | Mod: ,,, | Performed by: INTERNAL MEDICINE

## 2019-01-02 PROCEDURE — G0009 PNEUMOCOCCAL CONJUGATE VACCINE 13-VALENT LESS THAN 5YO & GREATER THAN: ICD-10-PCS | Mod: S$GLB,,, | Performed by: INTERNAL MEDICINE

## 2019-01-02 PROCEDURE — 99499 RISK ADDL DX/OHS AUDIT: ICD-10-PCS | Mod: S$GLB,,, | Performed by: INTERNAL MEDICINE

## 2019-01-02 PROCEDURE — 99214 PR OFFICE/OUTPT VISIT, EST, LEVL IV, 30-39 MIN: ICD-10-PCS | Mod: 25,S$GLB,, | Performed by: INTERNAL MEDICINE

## 2019-01-02 PROCEDURE — G0008 FLU VACCINE - HIGH DOSE (65+) PRESERVATIVE FREE IM: ICD-10-PCS | Mod: S$GLB,,, | Performed by: INTERNAL MEDICINE

## 2019-01-02 PROCEDURE — 90670 PCV13 VACCINE IM: CPT | Mod: S$GLB,,, | Performed by: INTERNAL MEDICINE

## 2019-01-02 PROCEDURE — 36415 COLL VENOUS BLD VENIPUNCTURE: CPT | Mod: PO

## 2019-01-02 PROCEDURE — 1101F PT FALLS ASSESS-DOCD LE1/YR: CPT | Mod: CPTII,S$GLB,, | Performed by: INTERNAL MEDICINE

## 2019-01-02 PROCEDURE — 99999 PR PBB SHADOW E&M-EST. PATIENT-LVL III: CPT | Mod: PBBFAC,,, | Performed by: INTERNAL MEDICINE

## 2019-01-02 PROCEDURE — 3074F PR MOST RECENT SYSTOLIC BLOOD PRESSURE < 130 MM HG: ICD-10-PCS | Mod: CPTII,S$GLB,, | Performed by: INTERNAL MEDICINE

## 2019-01-02 PROCEDURE — 99499 UNLISTED E&M SERVICE: CPT | Mod: S$GLB,,, | Performed by: INTERNAL MEDICINE

## 2019-01-02 RX ORDER — ATORVASTATIN CALCIUM 20 MG/1
20 TABLET, FILM COATED ORAL DAILY
Qty: 90 TABLET | Refills: 3 | Status: SHIPPED | OUTPATIENT
Start: 2019-01-02 | End: 2020-02-06

## 2019-01-02 RX ORDER — NYSTATIN 100000 [USP'U]/G
POWDER TOPICAL 4 TIMES DAILY
Qty: 60 G | Refills: 2 | Status: SHIPPED | OUTPATIENT
Start: 2019-01-02

## 2019-01-02 RX ORDER — AMLODIPINE BESYLATE 10 MG/1
10 TABLET ORAL DAILY
Qty: 90 TABLET | Refills: 3 | Status: SHIPPED | OUTPATIENT
Start: 2019-01-02 | End: 2019-02-18

## 2019-01-02 NOTE — PATIENT INSTRUCTIONS
Call your urologist for a follow up visit:    Julio Stein Jr., MD    [address], [place] [id number] [phone] (Fax)        For the feet - get some over the counter Zeasorb - antifungal drying powder - use it in both of your shoes.

## 2019-01-02 NOTE — PROGRESS NOTES
Patient received Pneumococcal 13 vaccine and flu vaccine tolerated it well. Patient received VIS information.

## 2019-01-03 NOTE — PROGRESS NOTES
PSA detectable.  Overdue for follow up with urology at [facility]  CMP and lipid good on statin  Results to pt. No changes.

## 2019-01-21 NOTE — PROGRESS NOTES
This note was created by combination of typed  and Dragon dictation.  Transcription errors may be present.  If there are any questions, please contact me.    Assessment & Plan:   Corn of toe  -no evidence of cellulitis, I don't think this is osteomyelitis. I think the pain is from the corn and pressure that results in pain throughout the proximal phalanx. S/p debridement. Hold on antibiotics. I would expect slow but steady pain improvement. If not- call - referral to podiatry.    Upcoming follow up visit with urology - he'll drop off the OV note when he's done.    There are no discontinued medications.    meds sent this encounter:       Follow Up: No Follow-up on file.    Subjective:     Chief Complaint   Patient presents with    Rash     left foot       HPI  Ash is a 68 y.o. male, last appointment with this clinic was 1/2/2019.    Hx of prostate CA.  S/p prostatectomy and XRT. [facility].  s/p open RRP in 2/16/2012. His preop bx was Gl 3+3, PSA was 5.1. Final pathology was Gl 3+4, margins negative, no SVs identified in specimen, no lymph nodes collected, pT2c, pNx, pMx. He got lost to follow up and on return his PSA is was 1.2. Harley of <0.01. Is now s/p salvage radiation therapy and PSA has been undetectable since.  10/24/17 CT Impression 1. No evidence of urolithiasis or obstructive uropathy.2. Status post prostatectomy. No pelvic or retroperitoneal lymphadenopathy. 3. Stable skeletal sclerotic metastasis.  10/28/17 Cystoscopy Negative  HTN, hx of ARB with angioedema  Hyperlipidemia, 7/2018 statin  Smoker 1 PPD x 30 years; precontemplative stage of quitting.   10/2017 CT abd/pelvis for surveillance of prostate CA, negative for AAA.  Had colonoscopy 8/8/17 with transverse colon polyp repeat 5 years.     Outside labs done with [facility], 09/28/2017 BMP normal.  2017 CT abdomen and pelvis:   1. No evidence of urolithiasis or obstructive uropathy.  2. Status post prostatectomy. No pelvic or  retroperitoneal  lymphadenopathy.  3. Stable skeletal sclerotic metastasis.  7/2016 MRI L spine: Heterogeneous prominent focus of enhancement within the right inferior L4 vertebral body.  Although differential may include degenerative change, degree of enhancement and clinical history raise  suspicion for metastatic lesion.  Sclerotic foci at other levels are  nonspecific and may represent metastatic disease although less  likely, or degenerative change. Short interval followup at 1-2 months  with repeat contrast MRI may allow for evaluation of progression for  Stability.    Last seen earlier this month.   At the time, tinea pedis, nystatin powder.    PSA detectable.  Overdue for follow up with urology at [facility]  CMP and lipid good on statin  Results to pt. No changes.    Upcoming follow up with [facility] urology.     Left foot - the pinky toe hurts a lot.  Burning sensation.  He's been using the drying powder.  Hurts to walk.     Patient Care Team:  Gabriel Ochoa MD as PCP - General (Internal Medicine)  Spike Khan MD (Inactive) (Internal Medicine)  Brook Hanson MD as Consulting Physician (Radiation Oncology)    Patient Active Problem List    Diagnosis Date Noted    Nuclear sclerosis of both eyes 08/07/2018    Refractive error 08/07/2018    Tubular adenoma of colon 8/8/217 08/01/2018    Pure hypercholesterolemia 07/27/2018    Smoker 01/31/2018    Prostate cancer s/p prostatectomy 2/16/12 and XRT with [facility] 01/31/2018     s/p open RRP in 2/16/2012. His preop bx was Gl 3+3, PSA was 5.1. Final pathology was Gl 3+4, margins negative, no SVs identified in specimen, no lymph nodes collected, pT2c, pNx, pMx. He got lost to follow up and on return his PSA is was 1.2. Harley of <0.01. Is now s/p salvage radiation therapy and PSA has been undetectable since.  10/28/17 Cystoscopy Negative  7/2016 Nu med bone scan: Focally increased uptake within the L54 vertebral body, concerning for metastatic disease. Additional areas  of sclerosis are seen on the CT within the T12 vertebral body inferiorly and several of the lumbar vertebrae. These areas are also concerning. If this would affect management, additional evaluation with MRI with intravenous contrast may be helpful for confirmation.  7/2016 MRI L spine: Heterogeneous prominent focus of enhancement within the right inferior L4 vertebral body.  Although differential may include degenerative change, degree of enhancement and clinical history raise suspicion for metastatic lesion.  Sclerotic foci at other levels are nonspecific and may represent metastatic disease although less likely, or degenerative change. Short interval followup at 1-2 months with repeat contrast MRI may allow for evaluation of progression for Stability. Spondylotic changes as above most prominent L4-5 and L5-S1.  10/2017 CT abd/pelvis: 1. No evidence of urolithiasis or obstructive uropathy. 2. Status post prostatectomy. No pelvic or retroperitoneal lymphadenopathy. 3. Stable skeletal sclerotic metastasis.      Hypertension, hx of angioedema from ARB; hx of olmesartan/amlodipine changed to amlodipine        PAST MEDICAL HISTORY:  Past Medical History:   Diagnosis Date    Hyperlipidemia     Hypertension     Nuclear sclerosis of both eyes 8/7/2018       PAST SURGICAL HISTORY:  Past Surgical History:   Procedure Laterality Date    CARPAL TUNNEL RELEASE Bilateral     HERNIA REPAIR      PROSTATE SURGERY         SOCIAL HISTORY:  Social History     Socioeconomic History    Marital status:      Spouse name: Not on file    Number of children: Not on file    Years of education: Not on file    Highest education level: Not on file   Social Needs    Financial resource strain: Not on file    Food insecurity - worry: Not on file    Food insecurity - inability: Not on file    Transportation needs - medical: Not on file    Transportation needs - non-medical: Not on file   Occupational History    Not on file  "  Tobacco Use    Smoking status: Current Every Day Smoker     Packs/day: 0.20     Years: 30.00     Pack years: 6.00     Types: Cigarettes    Smokeless tobacco: Never Used   Substance and Sexual Activity    Alcohol use: Yes    Drug use: No    Sexual activity: Yes     Partners: Female   Other Topics Concern    Not on file   Social History Narrative    Not on file       ALLERGIES AND MEDICATIONS: updated and reviewed.  Review of patient's allergies indicates:  No Known Allergies  Current Outpatient Medications   Medication Sig Dispense Refill    amLODIPine (NORVASC) 10 MG tablet Take 1 tablet (10 mg total) by mouth once daily. 90 tablet 3    atorvastatin (LIPITOR) 20 MG tablet Take 1 tablet (20 mg total) by mouth once daily. 90 tablet 3    nystatin (MYCOSTATIN) powder Apply topically 4 (four) times daily. To both feet 60 g 2    aspirin (ECOTRIN) 81 MG EC tablet Take 1 tablet (81 mg total) by mouth once daily.  0     No current facility-administered medications for this visit.        Review of Systems   All other systems reviewed and are negative.      Objective:   Physical Exam   Vitals:    01/22/19 1308   BP: 122/84   BP Location: Right arm   Patient Position: Sitting   BP Method: Large (Manual)   Pulse: (!) 57   Temp: 98.2 °F (36.8 °C)   TempSrc: Oral   SpO2: 98%   Weight: 100.9 kg (222 lb 7.1 oz)   Height: 6' 2.5" (1.892 m)    Body mass index is 28.18 kg/m².  Weight: 100.9 kg (222 lb 7.1 oz)   Height: 6' 2.5" (189.2 cm)     Physical Exam   Constitutional: He is oriented to person, place, and time. He appears well-developed and well-nourished. No distress.   HENT:   Head: Normocephalic and atraumatic.   Eyes: No scleral icterus.   Pulmonary/Chest: Effort normal.   Musculoskeletal:   The left foot pinky toe medial side with very thick callus/corn, no erythema, it is very thickened overgrown.  The bony phalanx underneath is fairly unremarkable without crepitus or deformity.  The MTP joint is unremarkable and " the metatarsals unremarkable.  The distal phalanx is unremarkable and nontender.  The proximal phalanx is tender with moderate pressure palpation but no edema  Intertriginous area between the 4th and 5th toe is without erythema or maceration   Neurological: He is alert and oriented to person, place, and time.   Skin: Skin is warm and dry.   Psychiatric: He has a normal mood and affect. His behavior is normal. Thought content normal.     The medial side of the pinky toe was cleaned with rubbing alcohol.  Using a #11 Scalpel, sharp debridement of the dead skin with some areas debrided to viable tissue underneath. Pt tolerated without complication.  Did have some slight bleeding from the edge of the debridement field.

## 2019-01-22 ENCOUNTER — OFFICE VISIT (OUTPATIENT)
Dept: FAMILY MEDICINE | Facility: CLINIC | Age: 69
End: 2019-01-22
Payer: MEDICARE

## 2019-01-22 VITALS
DIASTOLIC BLOOD PRESSURE: 84 MMHG | HEART RATE: 57 BPM | BODY MASS INDEX: 27.66 KG/M2 | WEIGHT: 222.44 LBS | OXYGEN SATURATION: 98 % | TEMPERATURE: 98 F | HEIGHT: 75 IN | SYSTOLIC BLOOD PRESSURE: 122 MMHG

## 2019-01-22 DIAGNOSIS — L84 CORN OF TOE: Primary | ICD-10-CM

## 2019-01-22 PROCEDURE — 3074F SYST BP LT 130 MM HG: CPT | Mod: CPTII,S$GLB,, | Performed by: INTERNAL MEDICINE

## 2019-01-22 PROCEDURE — 1101F PR PT FALLS ASSESS DOC 0-1 FALLS W/OUT INJ PAST YR: ICD-10-PCS | Mod: CPTII,S$GLB,, | Performed by: INTERNAL MEDICINE

## 2019-01-22 PROCEDURE — 99999 PR PBB SHADOW E&M-EST. PATIENT-LVL III: ICD-10-PCS | Mod: PBBFAC,,, | Performed by: INTERNAL MEDICINE

## 2019-01-22 PROCEDURE — 1101F PT FALLS ASSESS-DOCD LE1/YR: CPT | Mod: CPTII,S$GLB,, | Performed by: INTERNAL MEDICINE

## 2019-01-22 PROCEDURE — 99213 PR OFFICE/OUTPT VISIT, EST, LEVL III, 20-29 MIN: ICD-10-PCS | Mod: S$GLB,,, | Performed by: INTERNAL MEDICINE

## 2019-01-22 PROCEDURE — 99213 OFFICE O/P EST LOW 20 MIN: CPT | Mod: S$GLB,,, | Performed by: INTERNAL MEDICINE

## 2019-01-22 PROCEDURE — 3079F DIAST BP 80-89 MM HG: CPT | Mod: CPTII,S$GLB,, | Performed by: INTERNAL MEDICINE

## 2019-01-22 PROCEDURE — 3079F PR MOST RECENT DIASTOLIC BLOOD PRESSURE 80-89 MM HG: ICD-10-PCS | Mod: CPTII,S$GLB,, | Performed by: INTERNAL MEDICINE

## 2019-01-22 PROCEDURE — 3074F PR MOST RECENT SYSTOLIC BLOOD PRESSURE < 130 MM HG: ICD-10-PCS | Mod: CPTII,S$GLB,, | Performed by: INTERNAL MEDICINE

## 2019-01-22 PROCEDURE — 99999 PR PBB SHADOW E&M-EST. PATIENT-LVL III: CPT | Mod: PBBFAC,,, | Performed by: INTERNAL MEDICINE

## 2019-01-29 ENCOUNTER — OFFICE VISIT (OUTPATIENT)
Dept: OPTOMETRY | Facility: CLINIC | Age: 69
End: 2019-01-29
Payer: MEDICARE

## 2019-01-29 ENCOUNTER — OFFICE VISIT (OUTPATIENT)
Dept: FAMILY MEDICINE | Facility: CLINIC | Age: 69
End: 2019-01-29
Payer: MEDICARE

## 2019-01-29 VITALS
DIASTOLIC BLOOD PRESSURE: 82 MMHG | OXYGEN SATURATION: 97 % | BODY MASS INDEX: 28.83 KG/M2 | WEIGHT: 224.63 LBS | HEART RATE: 65 BPM | SYSTOLIC BLOOD PRESSURE: 114 MMHG | HEIGHT: 74 IN | TEMPERATURE: 98 F

## 2019-01-29 DIAGNOSIS — H04.002 LACRIMAL GLAND INFLAMMATION, LEFT: Primary | ICD-10-CM

## 2019-01-29 DIAGNOSIS — L03.213 PRESEPTAL CELLULITIS OF LEFT EYE: Primary | ICD-10-CM

## 2019-01-29 PROCEDURE — 92012 PR EYE EXAM, EST PATIENT,INTERMED: ICD-10-PCS | Mod: S$GLB,,, | Performed by: OPTOMETRIST

## 2019-01-29 PROCEDURE — 3074F SYST BP LT 130 MM HG: CPT | Mod: CPTII,S$GLB,, | Performed by: INTERNAL MEDICINE

## 2019-01-29 PROCEDURE — 99999 PR PBB SHADOW E&M-EST. PATIENT-LVL III: ICD-10-PCS | Mod: PBBFAC,,, | Performed by: INTERNAL MEDICINE

## 2019-01-29 PROCEDURE — 99999 PR PBB SHADOW E&M-EST. PATIENT-LVL III: CPT | Mod: PBBFAC,,, | Performed by: INTERNAL MEDICINE

## 2019-01-29 PROCEDURE — 1100F PR PT FALLS ASSESS DOC 2+ FALLS/FALL W/INJURY/YR: ICD-10-PCS | Mod: CPTII,S$GLB,, | Performed by: INTERNAL MEDICINE

## 2019-01-29 PROCEDURE — 99213 OFFICE O/P EST LOW 20 MIN: CPT | Mod: S$GLB,,, | Performed by: INTERNAL MEDICINE

## 2019-01-29 PROCEDURE — 3079F DIAST BP 80-89 MM HG: CPT | Mod: CPTII,S$GLB,, | Performed by: INTERNAL MEDICINE

## 2019-01-29 PROCEDURE — 99999 PR PBB SHADOW E&M-EST. PATIENT-LVL II: CPT | Mod: PBBFAC,,, | Performed by: OPTOMETRIST

## 2019-01-29 PROCEDURE — 99999 PR PBB SHADOW E&M-EST. PATIENT-LVL II: ICD-10-PCS | Mod: PBBFAC,,, | Performed by: OPTOMETRIST

## 2019-01-29 PROCEDURE — 92012 INTRM OPH EXAM EST PATIENT: CPT | Mod: S$GLB,,, | Performed by: OPTOMETRIST

## 2019-01-29 PROCEDURE — 99213 PR OFFICE/OUTPT VISIT, EST, LEVL III, 20-29 MIN: ICD-10-PCS | Mod: S$GLB,,, | Performed by: INTERNAL MEDICINE

## 2019-01-29 PROCEDURE — 3288F PR FALLS RISK ASSESSMENT DOCUMENTED: ICD-10-PCS | Mod: CPTII,S$GLB,, | Performed by: INTERNAL MEDICINE

## 2019-01-29 PROCEDURE — 3288F FALL RISK ASSESSMENT DOCD: CPT | Mod: CPTII,S$GLB,, | Performed by: INTERNAL MEDICINE

## 2019-01-29 PROCEDURE — 3079F PR MOST RECENT DIASTOLIC BLOOD PRESSURE 80-89 MM HG: ICD-10-PCS | Mod: CPTII,S$GLB,, | Performed by: INTERNAL MEDICINE

## 2019-01-29 PROCEDURE — 3074F PR MOST RECENT SYSTOLIC BLOOD PRESSURE < 130 MM HG: ICD-10-PCS | Mod: CPTII,S$GLB,, | Performed by: INTERNAL MEDICINE

## 2019-01-29 PROCEDURE — 1100F PTFALLS ASSESS-DOCD GE2>/YR: CPT | Mod: CPTII,S$GLB,, | Performed by: INTERNAL MEDICINE

## 2019-01-29 RX ORDER — AMOXICILLIN AND CLAVULANATE POTASSIUM 500; 125 MG/1; MG/1
1 TABLET, FILM COATED ORAL 2 TIMES DAILY
Qty: 20 TABLET | Refills: 0 | Status: SHIPPED | OUTPATIENT
Start: 2019-01-29 | End: 2019-02-08

## 2019-01-29 NOTE — PROGRESS NOTES
Subjective:       Patient ID: Ash Cedeno is a 68 y.o. male      Chief Complaint   Patient presents with    Eye Problem     History of Present Illness  Dls: 8/7/18 Dr. Souza    67 y/o male presents today with c/o x 4 days ago notice bump on ARLET increase in swelling ARLET pain when blinking or touching os tearing os no itching no changes in vision.   Pt did not use any gtts.            Assessment/Plan:     1. Preseptal cellulitis of left eye  Preseptal vs dacryadenitis. Pt report edema and tenderness to lateral upper eyelid. No palpable bump on exam. Eye movements FROM with no pain. Start augmentin PO BID x 10 days. Follow up in one week, sooner PRN.   - amoxicillin-clavulanate 500-125mg (AUGMENTIN) 500-125 mg Tab; Take 1 tablet (500 mg total) by mouth 2 (two) times daily. for 10 days  Dispense: 20 tablet; Refill: 0    Follow-up in about 1 week (around 2/5/2019), or if symptoms worsen or fail to improve.

## 2019-01-29 NOTE — PROGRESS NOTES
This note was created by combination of typed  and Dragon dictation.  Transcription errors may be present.  If there are any questions, please contact me.    Assessment & Plan:   Lacrimal gland inflammation, left  -his left eye hurts and he is getting dependent edema on the lower lid.  It localizes to around lacrimal gland on the superior lateral eye orbit.  I am going to refer him to Optometry for evaluation and recommendations.  -     Ambulatory referral to Optometry    There are no discontinued medications.    meds sent this encounter:       Follow Up: No Follow-up on file.    Subjective:     Chief Complaint   Patient presents with    Facial Swelling      left eye       HPI  Ash is a 68 y.o. male, last appointment with this clinic was 1/22/2019.    Hx of prostate CA.  S/p prostatectomy and XRT. [facility].  s/p open RRP in 2/16/2012. His preop bx was Gl 3+3, PSA was 5.1. Final pathology was Gl 3+4, margins negative, no SVs identified in specimen, no lymph nodes collected, pT2c, pNx, pMx. He got lost to follow up and on return his PSA is was 1.2. Harley of <0.01. Is now s/p salvage radiation therapy and PSA has been undetectable since.  10/24/17 CT Impression 1. No evidence of urolithiasis or obstructive uropathy.2. Status post prostatectomy. No pelvic or retroperitoneal lymphadenopathy. 3. Stable skeletal sclerotic metastasis.  10/28/17 Cystoscopy Negative  HTN, hx of ARB with angioedema  Hyperlipidemia, 7/2018 statin  Smoker 1 PPD x 30 years; precontemplative stage of quitting.   10/2017 CT abd/pelvis for surveillance of prostate CA, negative for AAA.  Had colonoscopy 8/8/17 with transverse colon polyp repeat 5 years.    Last seen for corn; it's better.    Left eye painful bump started on Fri and now having worse swelling and accumulation to the lower lid.  Someone told him it was allergy to seafood which he never had before.  He usually eats a seafood plantar every Friday.  So that  is why he was wondering if this was related to seafood.  Vision is normal/unchanged.  No trauma. No pain with moving eyes but with shutting eyes and opening wide, hurts.  No fever no chills.     Patient Care Team:  Gabriel Ochoa MD as PCP - General (Internal Medicine)  Spike Khan MD (Inactive) (Internal Medicine)  Brook Hanson MD as Consulting Physician (Radiation Oncology)    Patient Active Problem List    Diagnosis Date Noted    Nuclear sclerosis of both eyes 08/07/2018    Refractive error 08/07/2018    Tubular adenoma of colon 8/8/217 08/01/2018    Pure hypercholesterolemia 07/27/2018    Smoker 01/31/2018    Prostate cancer s/p prostatectomy 2/16/12 and XRT with [facility] 01/31/2018     s/p open RRP in 2/16/2012. His preop bx was Gl 3+3, PSA was 5.1. Final pathology was Gl 3+4, margins negative, no SVs identified in specimen, no lymph nodes collected, pT2c, pNx, pMx. He got lost to follow up and on return his PSA is was 1.2. Harley of <0.01. Is now s/p salvage radiation therapy and PSA has been undetectable since.  10/28/17 Cystoscopy Negative  7/2016 Nu med bone scan: Focally increased uptake within the L54 vertebral body, concerning for metastatic disease. Additional areas of sclerosis are seen on the CT within the T12 vertebral body inferiorly and several of the lumbar vertebrae. These areas are also concerning. If this would affect management, additional evaluation with MRI with intravenous contrast may be helpful for confirmation.  7/2016 MRI L spine: Heterogeneous prominent focus of enhancement within the right inferior L4 vertebral body.  Although differential may include degenerative change, degree of enhancement and clinical history raise suspicion for metastatic lesion.  Sclerotic foci at other levels are nonspecific and may represent metastatic disease although less likely, or degenerative change. Short interval followup at 1-2 months with repeat contrast MRI may allow for evaluation of  progression for Stability. Spondylotic changes as above most prominent L4-5 and L5-S1.  10/2017 CT abd/pelvis: 1. No evidence of urolithiasis or obstructive uropathy. 2. Status post prostatectomy. No pelvic or retroperitoneal lymphadenopathy. 3. Stable skeletal sclerotic metastasis.      Hypertension, hx of angioedema from ARB; hx of olmesartan/amlodipine changed to amlodipine        PAST MEDICAL HISTORY:  Past Medical History:   Diagnosis Date    Hyperlipidemia     Hypertension     Nuclear sclerosis of both eyes 8/7/2018       PAST SURGICAL HISTORY:  Past Surgical History:   Procedure Laterality Date    CARPAL TUNNEL RELEASE Bilateral     HERNIA REPAIR      PROSTATE SURGERY         SOCIAL HISTORY:  Social History     Socioeconomic History    Marital status:      Spouse name: Not on file    Number of children: Not on file    Years of education: Not on file    Highest education level: Not on file   Social Needs    Financial resource strain: Not on file    Food insecurity - worry: Not on file    Food insecurity - inability: Not on file    Transportation needs - medical: Not on file    Transportation needs - non-medical: Not on file   Occupational History    Not on file   Tobacco Use    Smoking status: Current Every Day Smoker     Packs/day: 0.20     Years: 30.00     Pack years: 6.00     Types: Cigarettes    Smokeless tobacco: Never Used   Substance and Sexual Activity    Alcohol use: Yes    Drug use: No    Sexual activity: Yes     Partners: Female   Other Topics Concern    Not on file   Social History Narrative    Not on file       ALLERGIES AND MEDICATIONS: updated and reviewed.  Review of patient's allergies indicates:  No Known Allergies  Current Outpatient Medications   Medication Sig Dispense Refill    amLODIPine (NORVASC) 10 MG tablet Take 1 tablet (10 mg total) by mouth once daily. 90 tablet 3    aspirin (ECOTRIN) 81 MG EC tablet Take 1 tablet (81 mg total) by mouth once daily.  " 0    atorvastatin (LIPITOR) 20 MG tablet Take 1 tablet (20 mg total) by mouth once daily. 90 tablet 3    nystatin (MYCOSTATIN) powder Apply topically 4 (four) times daily. To both feet 60 g 2     No current facility-administered medications for this visit.        Review of Systems   Constitutional: Negative for chills and fever.   Eyes: Positive for pain. Negative for blurred vision, double vision, photophobia, discharge and redness.   Skin: Negative for rash.       Objective:   Physical Exam   Vitals:    01/29/19 1401   BP: 114/82   Pulse: 65   Temp: 98.4 °F (36.9 °C)   TempSrc: Oral   SpO2: 97%   Weight: 101.9 kg (224 lb 10.4 oz)   Height: 6' 2" (1.88 m)    Body mass index is 28.84 kg/m².  Weight: 101.9 kg (224 lb 10.4 oz)   Height: 6' 2" (188 cm)     Physical Exam   Constitutional: He is oriented to person, place, and time. He appears well-developed and well-nourished. No distress.   HENT:   Head: Normocephalic and atraumatic.   Eyes: No scleral icterus.   This sclera is unremarkable, the irises are clear.  He does have some slight swelling over the superior lateral orbit and around the orbital bone he has some tenderness.  It does seem to localize to the area of his lacrimal gland.  He has some dependent edema on the lower orbit/eyelid.  No mass, no induration.  No erythema, no skin break.  Pupils equal round reactive to light, extraocular muscles intact no photophobia   Pulmonary/Chest: Effort normal.   Neurological: He is alert and oriented to person, place, and time.   Skin: Skin is warm and dry.   Psychiatric: He has a normal mood and affect. His behavior is normal. Thought content normal.     "

## 2019-02-05 ENCOUNTER — INITIAL CONSULT (OUTPATIENT)
Dept: OPTOMETRY | Facility: CLINIC | Age: 69
End: 2019-02-05
Payer: MEDICARE

## 2019-02-05 DIAGNOSIS — L03.213 PRESEPTAL CELLULITIS OF LEFT EYE: Primary | ICD-10-CM

## 2019-02-05 PROCEDURE — 99999 PR PBB SHADOW E&M-EST. PATIENT-LVL II: ICD-10-PCS | Mod: PBBFAC,,, | Performed by: OPTOMETRIST

## 2019-02-05 PROCEDURE — 92012 PR EYE EXAM, EST PATIENT,INTERMED: ICD-10-PCS | Mod: S$GLB,,, | Performed by: OPTOMETRIST

## 2019-02-05 PROCEDURE — 99999 PR PBB SHADOW E&M-EST. PATIENT-LVL II: CPT | Mod: PBBFAC,,, | Performed by: OPTOMETRIST

## 2019-02-05 PROCEDURE — 92012 INTRM OPH EXAM EST PATIENT: CPT | Mod: S$GLB,,, | Performed by: OPTOMETRIST

## 2019-02-05 NOTE — PROGRESS NOTES
Subjective:       Patient ID: Ahs Cedeno is a 68 y.o. male      Chief Complaint   Patient presents with    Follow-up     History of Present Illness  Dls: 1/29/19 Dr. Souza    69 y/o presents today for f/u preseptal cellulitis os.   Pt states os doing much better. No pain     Eye meds  augmentin PO BID         Assessment/Plan:     1. Preseptal cellulitis of left eye  Symptoms resolved. Pt doing much better. No pain. No tenderness. Finish course of oral abx. RTC PRN.    Follow-up if symptoms worsen or fail to improve.

## 2019-02-18 DIAGNOSIS — I10 ESSENTIAL HYPERTENSION: ICD-10-CM

## 2019-02-18 RX ORDER — AMLODIPINE BESYLATE 10 MG/1
TABLET ORAL
Qty: 90 TABLET | Refills: 0 | Status: SHIPPED | OUTPATIENT
Start: 2019-02-18 | End: 2020-02-06

## 2019-08-28 ENCOUNTER — OFFICE VISIT (OUTPATIENT)
Dept: FAMILY MEDICINE | Facility: CLINIC | Age: 69
End: 2019-08-28
Payer: MEDICARE

## 2019-08-28 VITALS
BODY MASS INDEX: 28.36 KG/M2 | WEIGHT: 221 LBS | OXYGEN SATURATION: 99 % | HEART RATE: 60 BPM | HEIGHT: 74 IN | TEMPERATURE: 98 F | DIASTOLIC BLOOD PRESSURE: 80 MMHG | SYSTOLIC BLOOD PRESSURE: 115 MMHG

## 2019-08-28 DIAGNOSIS — L84 CORN OF TOE: Primary | ICD-10-CM

## 2019-08-28 PROCEDURE — 99213 OFFICE O/P EST LOW 20 MIN: CPT | Mod: S$GLB,,, | Performed by: INTERNAL MEDICINE

## 2019-08-28 PROCEDURE — 3074F PR MOST RECENT SYSTOLIC BLOOD PRESSURE < 130 MM HG: ICD-10-PCS | Mod: CPTII,S$GLB,, | Performed by: INTERNAL MEDICINE

## 2019-08-28 PROCEDURE — 1101F PR PT FALLS ASSESS DOC 0-1 FALLS W/OUT INJ PAST YR: ICD-10-PCS | Mod: CPTII,S$GLB,, | Performed by: INTERNAL MEDICINE

## 2019-08-28 PROCEDURE — 99213 PR OFFICE/OUTPT VISIT, EST, LEVL III, 20-29 MIN: ICD-10-PCS | Mod: S$GLB,,, | Performed by: INTERNAL MEDICINE

## 2019-08-28 PROCEDURE — 99999 PR PBB SHADOW E&M-EST. PATIENT-LVL III: CPT | Mod: PBBFAC,,, | Performed by: INTERNAL MEDICINE

## 2019-08-28 PROCEDURE — 3079F DIAST BP 80-89 MM HG: CPT | Mod: CPTII,S$GLB,, | Performed by: INTERNAL MEDICINE

## 2019-08-28 PROCEDURE — 1101F PT FALLS ASSESS-DOCD LE1/YR: CPT | Mod: CPTII,S$GLB,, | Performed by: INTERNAL MEDICINE

## 2019-08-28 PROCEDURE — 99999 PR PBB SHADOW E&M-EST. PATIENT-LVL III: ICD-10-PCS | Mod: PBBFAC,,, | Performed by: INTERNAL MEDICINE

## 2019-08-28 PROCEDURE — 3074F SYST BP LT 130 MM HG: CPT | Mod: CPTII,S$GLB,, | Performed by: INTERNAL MEDICINE

## 2019-08-28 PROCEDURE — 3079F PR MOST RECENT DIASTOLIC BLOOD PRESSURE 80-89 MM HG: ICD-10-PCS | Mod: CPTII,S$GLB,, | Performed by: INTERNAL MEDICINE

## 2019-08-28 RX ORDER — ASPIRIN 81 MG/1
81 TABLET ORAL
COMMUNITY
Start: 2018-07-30

## 2019-08-28 RX ORDER — AMOXICILLIN AND CLAVULANATE POTASSIUM 500; 125 MG/1; MG/1
1 TABLET, FILM COATED ORAL
COMMUNITY
Start: 2019-01-29 | End: 2019-08-28

## 2019-08-28 RX ORDER — ATORVASTATIN CALCIUM 20 MG/1
20 TABLET, FILM COATED ORAL
COMMUNITY
Start: 2019-01-21 | End: 2019-08-28 | Stop reason: SDUPTHER

## 2019-08-28 RX ORDER — MEDICAL SUPPLY, MISCELLANEOUS
MISCELLANEOUS MISCELLANEOUS
COMMUNITY
Start: 2016-02-25

## 2019-08-28 NOTE — PROGRESS NOTES
This note was created by combination of typed  and Dragon dictation.  Transcription errors may be present.  If there are any questions, please contact me.    Assessment & Plan:   Corn of toe  -his discomfort is does seem to be disproportionate to the size of the corn; the skin is also pretty soft.  I am worried about possible underlying ulcer.  He did note some modest improvement after minimal debridement.  I am going to refer him to Podiatry for evaluation and more definitive debridement.  Also would like their recommendations about treatments for macerated tissue as his other toes have not responded to the topical antifungal powder.  -     Ambulatory referral to Podiatry        Medications Discontinued During This Encounter   Medication Reason    amoxicillin-clavulanate 500-125mg (AUGMENTIN) 500-125 mg Tab Patient no longer taking       meds sent this encounter:       Follow Up: No follow-ups on file.    Subjective:     Chief Complaint   Patient presents with    left foot pain     can barely walk       HPI  Ash is a 69 y.o. male, last appointment with this clinic was Visit date not found.    Last visit - corn of the right toes - better after debridement. Now the left foot hurts.  Similar area - between the 4th and 5th toe.  He has been using antifungal drying powder but still has macerated tissue of the right foot in between the toes.  Without pain. Pain on the left is comparable to the quality of pain on the right.  No systemic fevers or chills.  No erythema or swelling.  No drainage.    Patient Care Team:  Gabriel Ochoa MD as PCP - General (Internal Medicine)  Spike Khan MD (Internal Medicine)  Brook Hanson MD as Consulting Physician (Radiation Oncology)    Patient Active Problem List    Diagnosis Date Noted    Nuclear sclerosis of both eyes 08/07/2018    Refractive error 08/07/2018    Tubular adenoma of colon 8/8/217 08/01/2018    Pure hypercholesterolemia 07/27/2018    Smoker  01/31/2018    Prostate cancer s/p prostatectomy 2/16/12 and XRT with [facility]; 1/2019 saw urology - no change unless PSA jumps 01/31/2018     s/p open RRP in 2/16/2012. His preop bx was Gl 3+3, PSA was 5.1. Final pathology was Gl 3+4, margins negative, no SVs identified in specimen, no lymph nodes collected, pT2c, pNx, pMx. He got lost to follow up and on return his PSA is was 1.2. Harley of <0.01. Is now s/p salvage radiation therapy and PSA has been undetectable since.  10/28/17 Cystoscopy Negative  7/2016 Nu med bone scan: Focally increased uptake within the L54 vertebral body, concerning for metastatic disease. Additional areas of sclerosis are seen on the CT within the T12 vertebral body inferiorly and several of the lumbar vertebrae. These areas are also concerning. If this would affect management, additional evaluation with MRI with intravenous contrast may be helpful for confirmation.  7/2016 MRI L spine: Heterogeneous prominent focus of enhancement within the right inferior L4 vertebral body.  Although differential may include degenerative change, degree of enhancement and clinical history raise suspicion for metastatic lesion.  Sclerotic foci at other levels are nonspecific and may represent metastatic disease although less likely, or degenerative change. Short interval followup at 1-2 months with repeat contrast MRI may allow for evaluation of progression for Stability. Spondylotic changes as above most prominent L4-5 and L5-S1.  10/2017 CT abd/pelvis: 1. No evidence of urolithiasis or obstructive uropathy. 2. Status post prostatectomy. No pelvic or retroperitoneal lymphadenopathy. 3. Stable skeletal sclerotic metastasis.      Hypertension, hx of angioedema from ARB; hx of olmesartan/amlodipine changed to amlodipine        PAST MEDICAL HISTORY:  Past Medical History:   Diagnosis Date    Hyperlipidemia     Hypertension     Nuclear sclerosis of both eyes 8/7/2018       PAST SURGICAL HISTORY:  Past Surgical  History:   Procedure Laterality Date    CARPAL TUNNEL RELEASE Bilateral     HERNIA REPAIR      PROSTATE SURGERY         SOCIAL HISTORY:  Social History     Socioeconomic History    Marital status:      Spouse name: Not on file    Number of children: Not on file    Years of education: Not on file    Highest education level: Not on file   Occupational History    Not on file   Social Needs    Financial resource strain: Not on file    Food insecurity:     Worry: Not on file     Inability: Not on file    Transportation needs:     Medical: Not on file     Non-medical: Not on file   Tobacco Use    Smoking status: Current Every Day Smoker     Packs/day: 0.20     Years: 30.00     Pack years: 6.00     Types: Cigarettes    Smokeless tobacco: Never Used   Substance and Sexual Activity    Alcohol use: Yes    Drug use: No    Sexual activity: Yes     Partners: Female   Lifestyle    Physical activity:     Days per week: Not on file     Minutes per session: Not on file    Stress: Not at all   Relationships    Social connections:     Talks on phone: Not on file     Gets together: Not on file     Attends Gnosticist service: Not on file     Active member of club or organization: Not on file     Attends meetings of clubs or organizations: Not on file     Relationship status: Not on file   Other Topics Concern    Not on file   Social History Narrative    Not on file       ALLERGIES AND MEDICATIONS: updated and reviewed.  Review of patient's allergies indicates:  No Known Allergies  Current Outpatient Medications   Medication Sig Dispense Refill    amLODIPine (NORVASC) 10 MG tablet TAKE 1 TABLET(10 MG) BY MOUTH EVERY DAY 90 tablet 0    aspirin (ECOTRIN) 81 MG EC tablet Take 81 mg by mouth.      atorvastatin (LIPITOR) 20 MG tablet Take 1 tablet (20 mg total) by mouth once daily. 90 tablet 3    atorvastatin (LIPITOR) 20 MG tablet Take 20 mg by mouth.      miscellaneous medical supply (C-TUB) Misc Bard  "Cunningham Clamp - Apply to penis for urinary incontinence      nystatin (MYCOSTATIN) powder Apply topically 4 (four) times daily. To both feet 60 g 2     No current facility-administered medications for this visit.        Review of Systems   Constitutional: Negative for chills and fever.   Cardiovascular: Negative for chest pain.       Objective:   Physical Exam   Vitals:    08/28/19 1022   BP: 115/80   BP Location: Right arm   Patient Position: Sitting   BP Method: Medium (Manual)   Pulse: 60   Temp: 97.9 °F (36.6 °C)   TempSrc: Oral   SpO2: 99%   Weight: 100.3 kg (221 lb 0.2 oz)   Height: 6' 2" (1.88 m)    Body mass index is 28.38 kg/m².  Weight: 100.3 kg (221 lb 0.2 oz)   Height: 6' 2" (188 cm)     Physical Exam   Constitutional: He is oriented to person, place, and time. He appears well-developed and well-nourished. No distress.   HENT:   Head: Normocephalic and atraumatic.   Eyes: No scleral icterus.   Pulmonary/Chest: Effort normal.   Musculoskeletal: He exhibits no edema.   The left foot, the area of webbing between toes 4 and 5, with some macerated skin, with thickening, without obvious callus formation, with squeezing he notes it sensitive.  No erythema, no drainage. The toes themselves are unremarkable without obvious callus on the sides of the toes.   Neurological: He is alert and oriented to person, place, and time.   Skin: Skin is warm and dry.   Psychiatric: He has a normal mood and affect. His behavior is normal. Thought content normal.     I used a #15 Scalpel and attempted debridement. Was able to remove some superficial skin from the area. But with unclear total depth, and poor visual field, I was concerned about over-debridement and stopped.  "

## 2019-09-29 ENCOUNTER — PATIENT OUTREACH (OUTPATIENT)
Dept: ADMINISTRATIVE | Facility: OTHER | Age: 69
End: 2019-09-29

## 2019-10-02 ENCOUNTER — OFFICE VISIT (OUTPATIENT)
Dept: PODIATRY | Facility: CLINIC | Age: 69
End: 2019-10-02
Payer: MEDICARE

## 2019-10-02 VITALS
DIASTOLIC BLOOD PRESSURE: 70 MMHG | SYSTOLIC BLOOD PRESSURE: 111 MMHG | HEIGHT: 74 IN | WEIGHT: 221 LBS | BODY MASS INDEX: 28.36 KG/M2

## 2019-10-02 DIAGNOSIS — L98.8 MACERATION OF SKIN: Primary | ICD-10-CM

## 2019-10-02 DIAGNOSIS — B35.1 ONYCHOMYCOSIS DUE TO DERMATOPHYTE: ICD-10-CM

## 2019-10-02 PROCEDURE — 99999 PR PBB SHADOW E&M-EST. PATIENT-LVL II: ICD-10-PCS | Mod: PBBFAC,,, | Performed by: PODIATRIST

## 2019-10-02 PROCEDURE — 3078F DIAST BP <80 MM HG: CPT | Mod: CPTII,S$GLB,, | Performed by: PODIATRIST

## 2019-10-02 PROCEDURE — 99203 PR OFFICE/OUTPT VISIT, NEW, LEVL III, 30-44 MIN: ICD-10-PCS | Mod: S$GLB,,, | Performed by: PODIATRIST

## 2019-10-02 PROCEDURE — 1101F PR PT FALLS ASSESS DOC 0-1 FALLS W/OUT INJ PAST YR: ICD-10-PCS | Mod: CPTII,S$GLB,, | Performed by: PODIATRIST

## 2019-10-02 PROCEDURE — 99203 OFFICE O/P NEW LOW 30 MIN: CPT | Mod: S$GLB,,, | Performed by: PODIATRIST

## 2019-10-02 PROCEDURE — 3074F PR MOST RECENT SYSTOLIC BLOOD PRESSURE < 130 MM HG: ICD-10-PCS | Mod: CPTII,S$GLB,, | Performed by: PODIATRIST

## 2019-10-02 PROCEDURE — 3078F PR MOST RECENT DIASTOLIC BLOOD PRESSURE < 80 MM HG: ICD-10-PCS | Mod: CPTII,S$GLB,, | Performed by: PODIATRIST

## 2019-10-02 PROCEDURE — 99999 PR PBB SHADOW E&M-EST. PATIENT-LVL II: CPT | Mod: PBBFAC,,, | Performed by: PODIATRIST

## 2019-10-02 PROCEDURE — 3074F SYST BP LT 130 MM HG: CPT | Mod: CPTII,S$GLB,, | Performed by: PODIATRIST

## 2019-10-02 PROCEDURE — 1101F PT FALLS ASSESS-DOCD LE1/YR: CPT | Mod: CPTII,S$GLB,, | Performed by: PODIATRIST

## 2019-10-02 NOTE — PROGRESS NOTES
Subjective:      Patient ID: Ash Cedeno is a 69 y.o. male.    Chief Complaint: Foot Pain and Foot Problem    Ash is a 69 y.o. male who presents to the podiatry clinic  with complaint of  bilateral foot pain. Corn B/L 4th interdigital space Onset of the symptoms was several months ago. Precipitating event: none known. Current symptoms include: ability to bear weight, but with some pain. Aggravating factors: any weight bearing. Symptoms have progressed to a point and plateaued. Patient has had no prior foot problems. Evaluation to date: none. Treatment to date: none. Patients rates pain 5/10 on pain scale. Reports noticing moisture to B/L 4th interdigital space.         Review of Systems   Constitution: Negative for chills, diaphoresis and fever.   Cardiovascular: Negative for claudication, cyanosis, leg swelling and syncope.   Respiratory: Negative for cough and shortness of breath.    Skin: Positive for color change, nail changes and suspicious lesions.   Musculoskeletal: Negative for falls, joint pain, muscle cramps and muscle weakness.   Gastrointestinal: Negative for diarrhea, nausea and vomiting.   Neurological: Negative for disturbances in coordination, numbness, paresthesias, sensory change, tremors and weakness.   Psychiatric/Behavioral: Negative for altered mental status.           Objective:      Physical Exam   Constitutional: He appears well-developed. He is cooperative.   Oriented to time, place, and person.   Cardiovascular:   DP and PT pulses are palpable bilaterally. 3 sec capillary refill time and toes and feet are warm to touch proximally .  There is  hair growth on the feet and toes b/l. There is no edema b/l. No spider veins or varicosities present b/l.      Musculoskeletal:   Equinus noted b/l ankles with < 10 deg DF noted. MMT 5/5 in DF/PF/Inv/Ev resistance with no reproduction of pain in any direction. Passive range of motion of ankle and pedal joints is painless b/l.     Feet:   Right  Foot:   Skin Integrity: Negative for callus or dry skin.   Left Foot:   Skin Integrity: Negative for callus or dry skin.   Lymphadenopathy:   Negative lymphadenopathy bilateral popliteal fossa and tarsal tunnel.   Neurological: He is alert.   Light touch, proprioception, and sharp/dull sensation are all intact bilaterally. Protective threshold with the [place]-Wienstein monofilament is intact bilaterally.    Skin:   No open lesions, lacerations or wounds noted.Interdigital spaces clean, dry and intact b/l. No erythema noted to b/l foot.  Nails normal color and trophic qualities.    Focal hyperkeratotic lesion consisting entirely of hyperkeratotic tissue without open skin, drainage, pus, fluctuance, malodor, or signs of infection: B/L 4th interdigital space.     Interdigital maceration noted B/L 4th interdigital space and 3rd interdigital space right foot   Toenails 1-5 bilaterally are elongated by 2-3 mm, thickened by 2-3 mm, discolored/yellowed, dystrophic, brittle with subungual debris.       Psychiatric: He has a normal mood and affect.             Assessment:       Encounter Diagnoses   Name Primary?    Maceration of skin Yes    Onychomycosis due to dermatophyte          Plan:       Ash was seen today for foot pain and foot problem.    Diagnoses and all orders for this visit:    Maceration of skin    Onychomycosis due to dermatophyte      I counseled the patient on his conditions, their implications and medical management.  - Shoe inspection.  Foot Education. . Patient instructed on proper foot hygeine. We discussed wearing proper shoe gear, daily foot inspections, never walking without protective shoe gear, never putting sharp instruments to feet.    - After cleansing the area w/ alcohol prep pad the above mentioned hyperkeratosis was trimmed utilizing a currette , to a smooth base with out incident. Painted all interdigital spaces with betadine, applied hydrofera blue to B/L 4th interdigital space.  Instructions given, hydrofera blue given to patient.     F/u prn    Mariah Rondon DPM

## 2019-10-02 NOTE — LETTER
October 2, 2019      Gabriel Ochoa MD  [address]  [place] [address]           [facility]  [address]  [place] [address]  Phone: [phone]          Patient: Ash Cedeno   MR Number: [id number]   YOB: 1950   Date of Visit: 10/2/2019       Dear Dr. Gabriel Ochoa:    Thank you for referring Ash Cedeno to me for evaluation. Attached you will find relevant portions of my assessment and plan of care.    If you have questions, please do not hesitate to call me. I look forward to following Ash Cedeno along with you.    Sincerely,    Mariah Rondon, MONICA    Enclosure  CC:  No Recipients    If you would like to receive this communication electronically, please contact [email or web address] or [phone] to request more information on [employer] access.    For providers and/or their staff who would like to refer a patient to [facility], please contact us through our one-stop-shop provider referral line, [facility], at [phone].    If you feel you have received this communication in error or would no longer like to receive these types of communications, please e-mail [email or web address]

## 2020-01-06 DIAGNOSIS — I10 HYPERTENSION: ICD-10-CM

## 2020-02-06 DIAGNOSIS — I10 ESSENTIAL HYPERTENSION: ICD-10-CM

## 2020-02-06 DIAGNOSIS — E78.00 PURE HYPERCHOLESTEROLEMIA: ICD-10-CM

## 2020-02-06 RX ORDER — ATORVASTATIN CALCIUM 20 MG/1
TABLET, FILM COATED ORAL
Qty: 90 TABLET | Refills: 1 | Status: SHIPPED | OUTPATIENT
Start: 2020-02-06 | End: 2020-08-07

## 2020-02-06 RX ORDER — AMLODIPINE BESYLATE 10 MG/1
TABLET ORAL
Qty: 90 TABLET | Refills: 1 | Status: SHIPPED | OUTPATIENT
Start: 2020-02-06 | End: 2020-08-07